# Patient Record
Sex: FEMALE | Race: OTHER
[De-identification: names, ages, dates, MRNs, and addresses within clinical notes are randomized per-mention and may not be internally consistent; named-entity substitution may affect disease eponyms.]

---

## 2020-11-13 ENCOUNTER — HOSPITAL ENCOUNTER (INPATIENT)
Dept: HOSPITAL 54 - TELE2 | Age: 55
LOS: 7 days | Discharge: NURSING, REHAB OR LONG-TERM CARE | DRG: 137 | End: 2020-11-20
Attending: INTERNAL MEDICINE | Admitting: INTERNAL MEDICINE
Payer: COMMERCIAL

## 2020-11-13 VITALS — SYSTOLIC BLOOD PRESSURE: 141 MMHG | DIASTOLIC BLOOD PRESSURE: 70 MMHG

## 2020-11-13 VITALS — WEIGHT: 165 LBS | HEIGHT: 61 IN | BODY MASS INDEX: 31.15 KG/M2

## 2020-11-13 VITALS — SYSTOLIC BLOOD PRESSURE: 110 MMHG | DIASTOLIC BLOOD PRESSURE: 61 MMHG

## 2020-11-13 VITALS — SYSTOLIC BLOOD PRESSURE: 115 MMHG | DIASTOLIC BLOOD PRESSURE: 66 MMHG

## 2020-11-13 VITALS — DIASTOLIC BLOOD PRESSURE: 70 MMHG | SYSTOLIC BLOOD PRESSURE: 141 MMHG

## 2020-11-13 VITALS — DIASTOLIC BLOOD PRESSURE: 61 MMHG | SYSTOLIC BLOOD PRESSURE: 110 MMHG

## 2020-11-13 DIAGNOSIS — J96.01: ICD-10-CM

## 2020-11-13 DIAGNOSIS — U07.1: Primary | ICD-10-CM

## 2020-11-13 DIAGNOSIS — H35.9: ICD-10-CM

## 2020-11-13 DIAGNOSIS — G43.909: ICD-10-CM

## 2020-11-13 DIAGNOSIS — D64.9: ICD-10-CM

## 2020-11-13 DIAGNOSIS — J12.89: ICD-10-CM

## 2020-11-13 DIAGNOSIS — E44.0: ICD-10-CM

## 2020-11-13 DIAGNOSIS — M06.4: ICD-10-CM

## 2020-11-13 LAB
ALBUMIN SERPL BCP-MCNC: 3 G/DL (ref 3.4–5)
ALP SERPL-CCNC: 88 U/L (ref 46–116)
ALT SERPL W P-5'-P-CCNC: 35 U/L (ref 12–78)
AST SERPL W P-5'-P-CCNC: 28 U/L (ref 15–37)
BASOPHILS # BLD AUTO: 0 /CMM (ref 0–0.2)
BASOPHILS NFR BLD AUTO: 0.2 % (ref 0–2)
BILIRUB SERPL-MCNC: 0.5 MG/DL (ref 0.2–1)
BUN SERPL-MCNC: 9 MG/DL (ref 7–18)
CALCIUM SERPL-MCNC: 9 MG/DL (ref 8.5–10.1)
CHLORIDE SERPL-SCNC: 101 MMOL/L (ref 98–107)
CO2 SERPL-SCNC: 29 MMOL/L (ref 21–32)
CREAT SERPL-MCNC: 0.9 MG/DL (ref 0.6–1.3)
EOSINOPHIL NFR BLD AUTO: 0.1 % (ref 0–6)
GLUCOSE SERPL-MCNC: 101 MG/DL (ref 74–106)
HCT VFR BLD AUTO: 36 % (ref 33–45)
HGB BLD-MCNC: 12 G/DL (ref 11.5–14.8)
LYMPHOCYTES NFR BLD AUTO: 1.1 /CMM (ref 0.8–4.8)
LYMPHOCYTES NFR BLD AUTO: 19.8 % (ref 20–44)
MCHC RBC AUTO-ENTMCNC: 33 G/DL (ref 31–36)
MCV RBC AUTO: 88 FL (ref 82–100)
MONOCYTES NFR BLD AUTO: 0.3 /CMM (ref 0.1–1.3)
MONOCYTES NFR BLD AUTO: 6.2 % (ref 2–12)
NEUTROPHILS # BLD AUTO: 4.1 /CMM (ref 1.8–8.9)
NEUTROPHILS NFR BLD AUTO: 73.7 % (ref 43–81)
PLATELET # BLD AUTO: 186 /CMM (ref 150–450)
POTASSIUM SERPL-SCNC: 4.4 MMOL/L (ref 3.5–5.1)
PROT SERPL-MCNC: 7.7 G/DL (ref 6.4–8.2)
RBC # BLD AUTO: 4.11 MIL/UL (ref 4–5.2)
SODIUM SERPL-SCNC: 137 MMOL/L (ref 136–145)
WBC NRBC COR # BLD AUTO: 5.5 K/UL (ref 4.3–11)

## 2020-11-13 PROCEDURE — G0378 HOSPITAL OBSERVATION PER HR: HCPCS

## 2020-11-13 RX ADMIN — ACETAMINOPHEN PRN MG: 325 TABLET ORAL at 23:37

## 2020-11-13 RX ADMIN — ACETAMINOPHEN PRN MG: 325 TABLET ORAL at 17:04

## 2020-11-13 NOTE — NUR
PT ASLEEP IN BED AND EASILY AROUSABLE. REPORT PAIN WHEN BREATHING IN ANTERIOR MID CHEST AND 
POSTERIOR CHEST AND MILD HEADACHE. ADMINISTERED ORDERED ACETAMINOPHEN. ALL ORDERS 
IMPLEMENTED. 2L/MIN NC RUNNING AND SAO2 98%. NO SIGNS OF DISTRESS. TELE MONITOR ON READING 
SR IN 60S WITH OCCASIONAL PVCS. PULSES STRONG. URINATED W NO ISSUES AND AMBULATES WELL TO 
BATHROOM. L AC SALINE LOCKED. DRESSING DRY AND INTACT. MONITORED BREATHING, RESPIRATIONS, 
SAO2 THROUGHOUT SHIFT. MONITORED VS AND PAIN THROUGHOUT SHIFT AND REPORTED AS NEEDED TO MD. 
WILL ENDORSE TO PM RN FOR CONTINUATION OF CARE.

## 2020-11-13 NOTE — NUR
RECEIVED PT FROM [facility]. PHONE REPORT GIVEN FROM CARDENAS. UPON ADMISSION PT A/OX 4. 
AMBULATORY W STEADY GAIT AND STRONG EXTREMITIES. REPORT PAIN WHEN BREATHING IN ANTERIOR MID 
CHEST AND POSTERIOR CHEST. 2L/MIN NC RUNNING AND SAO2 100%. NO SIGNS OF DISTRESS. TELE 
MONITOR ON READING SR IN 60S WITH OCCASIONAL PVCS. PULSES STRONG. URINATED W NO ISSUES AND 
AMBULATES WELL TO BATHROOM. L AC SALINE LOCKED. DRESSING DRY AND INTACT. PT BELONGINGS 
NOTED, PT EXPRESSED WANTING TO KEEP ALL BELONGINGS AT BEDSIDE. SKIN INTACT. NO CUTS BRUISES 
OR LESIONS. WILL MONITOR PATIENT AND REPORT TO MD AS NEEDED.

## 2020-11-13 NOTE — NUR
TELE/RN NOTE 



Patient c/o pain level 7 on back, aching and throbbing. Administered PRN norco as ordered. 
VSS Will continue to monitor.

## 2020-11-13 NOTE — NUR
TELE/RN OPENING NOTE 



Patient asleep in bed, A/O x4, ambulatory. No JVD. Tongue midline, no tracheal deviation. 
CRP <3seconds. Pedal and brachial pulses 2+, symmetrical. Tele monitor reading sinus rhythm 
with PVCs in the 60s. Breathing even, unlabored on 2 LPM NC. Skin warm, pink, dry 
appropriate for ethnicity, intact. IV site LAC 20g saline locked, patent and intact. No 
signs of redness or infiltration. Abdomen round, soft, non-tender. BS active. Patient void 
via BRP without difficulty. Bed in low position, wheels locked, side rails up x2, call light 
within reach.

## 2020-11-13 NOTE — NUR
Continue chantix another 4-5 months.  If she doesn't get off completely she is to let me know.   TELE/RN NOTE 



Patient temperature is 100.3. Administered PRN acetaminophen as ordered. Will continue to 
monitor.

## 2020-11-14 VITALS — SYSTOLIC BLOOD PRESSURE: 122 MMHG | DIASTOLIC BLOOD PRESSURE: 68 MMHG

## 2020-11-14 VITALS — DIASTOLIC BLOOD PRESSURE: 64 MMHG | SYSTOLIC BLOOD PRESSURE: 117 MMHG

## 2020-11-14 VITALS — DIASTOLIC BLOOD PRESSURE: 68 MMHG | SYSTOLIC BLOOD PRESSURE: 122 MMHG

## 2020-11-14 VITALS — SYSTOLIC BLOOD PRESSURE: 109 MMHG | DIASTOLIC BLOOD PRESSURE: 69 MMHG

## 2020-11-14 VITALS — DIASTOLIC BLOOD PRESSURE: 62 MMHG | SYSTOLIC BLOOD PRESSURE: 108 MMHG

## 2020-11-14 VITALS — DIASTOLIC BLOOD PRESSURE: 99 MMHG | SYSTOLIC BLOOD PRESSURE: 146 MMHG

## 2020-11-14 VITALS — SYSTOLIC BLOOD PRESSURE: 116 MMHG | DIASTOLIC BLOOD PRESSURE: 65 MMHG

## 2020-11-14 LAB
BASOPHILS # BLD AUTO: 0 /CMM (ref 0–0.2)
BASOPHILS NFR BLD AUTO: 0.3 % (ref 0–2)
BUN SERPL-MCNC: 11 MG/DL (ref 7–18)
CALCIUM SERPL-MCNC: 9 MG/DL (ref 8.5–10.1)
CHLORIDE SERPL-SCNC: 100 MMOL/L (ref 98–107)
CHOLEST SERPL-MCNC: 118 MG/DL (ref ?–200)
CO2 SERPL-SCNC: 30 MMOL/L (ref 21–32)
CREAT SERPL-MCNC: 1 MG/DL (ref 0.6–1.3)
CRP SERPL-MCNC: 17.4 MG/DL (ref 0–0.9)
EOSINOPHIL NFR BLD AUTO: 0 % (ref 0–6)
FERRITIN SERPL-MCNC: 515 NG/ML (ref 8–388)
GLUCOSE SERPL-MCNC: 97 MG/DL (ref 74–106)
HCT VFR BLD AUTO: 34 % (ref 33–45)
HDLC SERPL-MCNC: 34 MG/DL (ref 40–60)
HGB BLD-MCNC: 11.2 G/DL (ref 11.5–14.8)
LDLC SERPL DIRECT ASSAY-MCNC: 75 MG/DL (ref 0–99)
LYMPHOCYTES NFR BLD AUTO: 1.1 /CMM (ref 0.8–4.8)
LYMPHOCYTES NFR BLD AUTO: 20.6 % (ref 20–44)
MAGNESIUM SERPL-MCNC: 2.4 MG/DL (ref 1.8–2.4)
MCHC RBC AUTO-ENTMCNC: 33 G/DL (ref 31–36)
MCV RBC AUTO: 88 FL (ref 82–100)
MONOCYTES NFR BLD AUTO: 0.3 /CMM (ref 0.1–1.3)
MONOCYTES NFR BLD AUTO: 5.2 % (ref 2–12)
NEUTROPHILS # BLD AUTO: 4.1 /CMM (ref 1.8–8.9)
NEUTROPHILS NFR BLD AUTO: 73.9 % (ref 43–81)
PHOSPHATE SERPL-MCNC: 3.7 MG/DL (ref 2.5–4.9)
PLATELET # BLD AUTO: 187 /CMM (ref 150–450)
POTASSIUM SERPL-SCNC: 4 MMOL/L (ref 3.5–5.1)
RBC # BLD AUTO: 3.81 MIL/UL (ref 4–5.2)
SODIUM SERPL-SCNC: 136 MMOL/L (ref 136–145)
TRIGL SERPL-MCNC: 86 MG/DL (ref 30–150)
WBC NRBC COR # BLD AUTO: 5.5 K/UL (ref 4.3–11)

## 2020-11-14 PROCEDURE — 30233L1 TRANSFUSION OF NONAUTOLOGOUS FRESH PLASMA INTO PERIPHERAL VEIN, PERCUTANEOUS APPROACH: ICD-10-PCS | Performed by: INTERNAL MEDICINE

## 2020-11-14 RX ADMIN — ACETAMINOPHEN PRN MG: 325 TABLET ORAL at 14:48

## 2020-11-14 RX ADMIN — DEXTROSE MONOHYDRATE SCH MLS/HR: 50 INJECTION, SOLUTION INTRAVENOUS at 09:04

## 2020-11-14 RX ADMIN — DEXTROSE MONOHYDRATE SCH MLS/HR: 50 INJECTION, SOLUTION INTRAVENOUS at 01:58

## 2020-11-14 RX ADMIN — PANTOPRAZOLE SODIUM SCH MG: 40 TABLET, DELAYED RELEASE ORAL at 06:33

## 2020-11-14 RX ADMIN — DEXAMETHASONE SODIUM PHOSPHATE SCH MG: 10 INJECTION INTRAMUSCULAR; INTRAVENOUS at 08:50

## 2020-11-14 NOTE — NUR
RN CLOSING NOTE 



Patient is resting in bed, A/o x4, SOB upon exertion, o2 95% on 3L NC. Tele monitor SR 
60-70s. Patient denies any pain at this time. Temperature is 99.6F, cooling measures 
implemented and temp went down to 98.8F. IV line in the LAC#20g is clean and intact s/l. 
Patient is ambulatory with BRP and independent with care. Consent for convalsecent plasma 
has been signed by the patient. 

All patient needs met, all due medications given, patient kept clean and dry throughout 
shift. Bed is in lowest position, side rails x2 in upright position, call light is within 
reach, fall safety and aspiration precautions enforced. Isolation for COVID positive. Will 
endorse to night shift.

## 2020-11-14 NOTE — NUR
RECEIVED  IN BED LYING ON HER RIGHT SIDE

ALERT ORIENTATED X4

WHEN NAME SPOKEN  TURNED OVER AND ACKNOWLEDGED THE NURSE

DENIES SOB OR PAIN 

02 ON NC 92% AT  4LITERS

## 2020-11-14 NOTE — NUR
RN OPENING NOTE 



Patient is resting in bed, A/o x4, SOB upon exertion, o2 95% on 3L NC. Tele monitor SR 67. 
Patient denies any pain at this time. Temperature is 99.6F, cooling measures implemented and 
temp went down to 98.8F. IV line in the LAC#20g is clean and intact s/l. Patient is 
ambulatory with BRP and independent with care. Bed is in lowest position, side rails x2 in 
upright position, call light is within reach, fall safety and aspiration precautions 
enforced. Isolation for COVID positive. Will continue with plan of care.

## 2020-11-14 NOTE — NUR
RN NOTE 



Per Dr. Alcantar to order convalescent plasma for patient. Paperwork has been filled out by Dr. Alcantar.

## 2020-11-14 NOTE — NUR
TELE/RN CLOSING NOTE 



Patient asleep in bed, A/O x4, ambulatory. Tele monitor reading sinus rhythm with PVCs in 
the 70s. Breathing even, unlabored on 2 LPM NC. Skin warm, pink, dry appropriate for 
ethnicity, intact. IV site LAC 20g saline locked, patent and intact. No signs of redness or 
infiltration.  Patient void via BRP 4x, without difficulty. All scheduled medications given 
and orders carried out. Bed in low position, wheels locked, side rails up x2, call light 
within reach.

## 2020-11-15 VITALS — DIASTOLIC BLOOD PRESSURE: 71 MMHG | SYSTOLIC BLOOD PRESSURE: 122 MMHG

## 2020-11-15 VITALS — DIASTOLIC BLOOD PRESSURE: 70 MMHG | SYSTOLIC BLOOD PRESSURE: 112 MMHG

## 2020-11-15 VITALS — SYSTOLIC BLOOD PRESSURE: 130 MMHG | DIASTOLIC BLOOD PRESSURE: 73 MMHG

## 2020-11-15 VITALS — SYSTOLIC BLOOD PRESSURE: 123 MMHG | DIASTOLIC BLOOD PRESSURE: 70 MMHG

## 2020-11-15 VITALS — SYSTOLIC BLOOD PRESSURE: 123 MMHG | DIASTOLIC BLOOD PRESSURE: 67 MMHG

## 2020-11-15 VITALS — DIASTOLIC BLOOD PRESSURE: 70 MMHG | SYSTOLIC BLOOD PRESSURE: 127 MMHG

## 2020-11-15 VITALS — DIASTOLIC BLOOD PRESSURE: 78 MMHG | SYSTOLIC BLOOD PRESSURE: 142 MMHG

## 2020-11-15 LAB
ALBUMIN SERPL BCP-MCNC: 2.7 G/DL (ref 3.4–5)
ALP SERPL-CCNC: 89 U/L (ref 46–116)
ALT SERPL W P-5'-P-CCNC: 37 U/L (ref 12–78)
AST SERPL W P-5'-P-CCNC: 27 U/L (ref 15–37)
BASOPHILS # BLD AUTO: 0 /CMM (ref 0–0.2)
BASOPHILS NFR BLD AUTO: 0.1 % (ref 0–2)
BILIRUB DIRECT SERPL-MCNC: 0.1 MG/DL (ref 0–0.2)
BILIRUB SERPL-MCNC: 0.4 MG/DL (ref 0.2–1)
BUN SERPL-MCNC: 14 MG/DL (ref 7–18)
CALCIUM SERPL-MCNC: 9.2 MG/DL (ref 8.5–10.1)
CHLORIDE SERPL-SCNC: 103 MMOL/L (ref 98–107)
CO2 SERPL-SCNC: 25 MMOL/L (ref 21–32)
CREAT SERPL-MCNC: 0.9 MG/DL (ref 0.6–1.3)
EOSINOPHIL NFR BLD AUTO: 0 % (ref 0–6)
GLUCOSE SERPL-MCNC: 95 MG/DL (ref 74–106)
HCT VFR BLD AUTO: 32 % (ref 33–45)
HGB BLD-MCNC: 10.9 G/DL (ref 11.5–14.8)
LYMPHOCYTES NFR BLD AUTO: 1.1 /CMM (ref 0.8–4.8)
LYMPHOCYTES NFR BLD AUTO: 13.2 % (ref 20–44)
MAGNESIUM SERPL-MCNC: 2.3 MG/DL (ref 1.8–2.4)
MCHC RBC AUTO-ENTMCNC: 34 G/DL (ref 31–36)
MCV RBC AUTO: 87 FL (ref 82–100)
MONOCYTES NFR BLD AUTO: 0.5 /CMM (ref 0.1–1.3)
MONOCYTES NFR BLD AUTO: 5.8 % (ref 2–12)
NEUTROPHILS # BLD AUTO: 6.6 /CMM (ref 1.8–8.9)
NEUTROPHILS NFR BLD AUTO: 80.9 % (ref 43–81)
PHOSPHATE SERPL-MCNC: 2.9 MG/DL (ref 2.5–4.9)
PLATELET # BLD AUTO: 217 /CMM (ref 150–450)
POTASSIUM SERPL-SCNC: 3.5 MMOL/L (ref 3.5–5.1)
PROT SERPL-MCNC: 7.3 G/DL (ref 6.4–8.2)
RBC # BLD AUTO: 3.72 MIL/UL (ref 4–5.2)
SODIUM SERPL-SCNC: 139 MMOL/L (ref 136–145)
WBC NRBC COR # BLD AUTO: 8.1 K/UL (ref 4.3–11)

## 2020-11-15 RX ADMIN — DEXTROSE MONOHYDRATE SCH MLS/HR: 50 INJECTION, SOLUTION INTRAVENOUS at 09:30

## 2020-11-15 RX ADMIN — PANTOPRAZOLE SODIUM SCH MG: 40 TABLET, DELAYED RELEASE ORAL at 08:35

## 2020-11-15 RX ADMIN — ENOXAPARIN SODIUM SCH MG: 40 INJECTION SUBCUTANEOUS at 11:03

## 2020-11-15 RX ADMIN — DEXTROSE MONOHYDRATE SCH MLS/HR: 50 INJECTION, SOLUTION INTRAVENOUS at 08:36

## 2020-11-15 RX ADMIN — DEXAMETHASONE SODIUM PHOSPHATE SCH MG: 10 INJECTION INTRAMUSCULAR; INTRAVENOUS at 08:35

## 2020-11-15 RX ADMIN — REMDESIVIR SCH MLS/HR: 5 INJECTION INTRAVENOUS at 16:09

## 2020-11-15 NOTE — NUR
TELE RN NOTES



FOLLOWED UP WITH BLOOD BANK ABOUT CONVALESCENT PLASMA AND PLASMA IS NOT AVAILABLE YET, BLOOD 
BANK WILL CALL AND NOTIFY WHEN IT IS READY.

## 2020-11-15 NOTE — NUR
TELE RN NOTES



PATIENT IN BED RESTING COMFORTABLY, ALERT AND ORIENTED X 4, Georgian SPEAKING. ON NASAL 
CANNULA 3 LITERS WITH NO SIGNS OF RESPIRATORY DISTRESS, WITH EVEN NON-LABORED BREATHING AT 
THIS TIME. ON CARDIAC MONITOR, SINUS RHYTHM 63. STILL AWAITING FOR CONVALESCENT PLASMA. SKIN 
KEPT CLEAN, WARM AND DRY TO TOUCH. IV ACCESS INTACT AND PATENT. MET ALL OF PATIENTS NEEDS. 
PATIENT PRESENTS WITH NO PAIN OR DISCOMFORT AT THIS TIME. ISOLATION PRECAUTIONS IMPLEMENTED. 
SAFETY PRECAUTIONS IMPLEMENTED WITH BED LOCKED, BED IN THE LOWEST POSITION, BILATERAL SIDE 
RAILS UP, BED ALARM ON, AND CALL LIGHT WITHIN EASY REACH. WILL ENDORSE PLAN OF CARE TO 
UPCOMING RN.

## 2020-11-15 NOTE — NUR
CLOSING NOTES: ALERT AND ORIENTATED X4 ONE COUGHING SPELL THIS NIGHT AMBULATES TO THE 
BATHROOM STEADY ON HER FEET. SOB WHEN SHE AMBULATES TO THE BATHROOM, INSTRUCTED HER TO KEP 
THE 02 ON D/T THE TUBING IS LONG AND WILL MAKE IT TO THE BATHROOM.



02 AT 2 LITERS SATS 95%

## 2020-11-15 NOTE — NUR
TELE RN NOTES



FOLLOWED UP WITH BLOOD BANK ABOUT CONVALESCENT PLASMA AND PLASMA IS NOT AVAILABLE YET.

## 2020-11-15 NOTE — NUR
rn notes:

follow up with lab regarding plasma convalescent, per lab there still no available unit at 
si time. will let us know once they receive it.

## 2020-11-15 NOTE — NUR
TELE RN NOTES



PATIENT RECEIVED IN BED RESTING COMFORTABLY, ALERT AND ORIENTED X 4, Welsh SPEAKING. ON 
NASAL CANNULA 2LITERS WITH NO SIGNS OF RESPIRATORY DISTRESS, WITH EVEN NON-LABORED BREATHING 
AT THIS TIME. ON CARDIAC MONITOR, SINUS RHYTHM 66. SKIN WARM AND DRY TO TOUCH. IV ACCESS 
INTACT AND PATENT. PATIENT PRESENTS WITH NO PAIN OR DISCOMFORT AT THIS TIME. ISOLATION 
PRECAUTIONS IMPLEMENTED. SAFETY PRECAUTIONS IMPLEMENTED WITH BED LOCKED, BED IN THE LOWEST 
POSITION, BILATERAL SIDE RAILS UP, BED ALARM ON, AND CALL LIGHT WITHIN EASY REACH. WILL 
CONTINUE TO MONITOR PATIENT.

## 2020-11-16 VITALS — SYSTOLIC BLOOD PRESSURE: 117 MMHG | DIASTOLIC BLOOD PRESSURE: 65 MMHG

## 2020-11-16 VITALS — SYSTOLIC BLOOD PRESSURE: 134 MMHG | DIASTOLIC BLOOD PRESSURE: 72 MMHG

## 2020-11-16 VITALS — DIASTOLIC BLOOD PRESSURE: 77 MMHG | SYSTOLIC BLOOD PRESSURE: 140 MMHG

## 2020-11-16 VITALS — SYSTOLIC BLOOD PRESSURE: 117 MMHG | DIASTOLIC BLOOD PRESSURE: 75 MMHG

## 2020-11-16 VITALS — DIASTOLIC BLOOD PRESSURE: 7 MMHG | SYSTOLIC BLOOD PRESSURE: 134 MMHG

## 2020-11-16 VITALS — SYSTOLIC BLOOD PRESSURE: 113 MMHG | DIASTOLIC BLOOD PRESSURE: 67 MMHG

## 2020-11-16 VITALS — SYSTOLIC BLOOD PRESSURE: 136 MMHG | DIASTOLIC BLOOD PRESSURE: 75 MMHG

## 2020-11-16 VITALS — SYSTOLIC BLOOD PRESSURE: 121 MMHG | DIASTOLIC BLOOD PRESSURE: 121 MMHG

## 2020-11-16 LAB
ALBUMIN SERPL BCP-MCNC: 2.7 G/DL (ref 3.4–5)
ALP SERPL-CCNC: 79 U/L (ref 46–116)
ALT SERPL W P-5'-P-CCNC: 31 U/L (ref 12–78)
AST SERPL W P-5'-P-CCNC: 20 U/L (ref 15–37)
BASOPHILS # BLD AUTO: 0 /CMM (ref 0–0.2)
BASOPHILS NFR BLD AUTO: 0.3 % (ref 0–2)
BILIRUB DIRECT SERPL-MCNC: 0.1 MG/DL (ref 0–0.2)
BILIRUB SERPL-MCNC: 0.3 MG/DL (ref 0.2–1)
BUN SERPL-MCNC: 17 MG/DL (ref 7–18)
CALCIUM SERPL-MCNC: 9.4 MG/DL (ref 8.5–10.1)
CHLORIDE SERPL-SCNC: 105 MMOL/L (ref 98–107)
CO2 SERPL-SCNC: 28 MMOL/L (ref 21–32)
CREAT SERPL-MCNC: 0.9 MG/DL (ref 0.6–1.3)
EOSINOPHIL NFR BLD AUTO: 0 % (ref 0–6)
GLUCOSE SERPL-MCNC: 98 MG/DL (ref 74–106)
HCT VFR BLD AUTO: 33 % (ref 33–45)
HGB BLD-MCNC: 11.2 G/DL (ref 11.5–14.8)
LYMPHOCYTES NFR BLD AUTO: 1 /CMM (ref 0.8–4.8)
LYMPHOCYTES NFR BLD AUTO: 18.8 % (ref 20–44)
MAGNESIUM SERPL-MCNC: 2.4 MG/DL (ref 1.8–2.4)
MCHC RBC AUTO-ENTMCNC: 34 G/DL (ref 31–36)
MCV RBC AUTO: 89 FL (ref 82–100)
MONOCYTES NFR BLD AUTO: 0.5 /CMM (ref 0.1–1.3)
MONOCYTES NFR BLD AUTO: 10 % (ref 2–12)
NEUTROPHILS # BLD AUTO: 3.8 /CMM (ref 1.8–8.9)
NEUTROPHILS NFR BLD AUTO: 70.9 % (ref 43–81)
PHOSPHATE SERPL-MCNC: 3.3 MG/DL (ref 2.5–4.9)
PLATELET # BLD AUTO: 259 /CMM (ref 150–450)
POTASSIUM SERPL-SCNC: 4 MMOL/L (ref 3.5–5.1)
PROT SERPL-MCNC: 7.4 G/DL (ref 6.4–8.2)
RBC # BLD AUTO: 3.76 MIL/UL (ref 4–5.2)
SODIUM SERPL-SCNC: 141 MMOL/L (ref 136–145)
WBC NRBC COR # BLD AUTO: 5.3 K/UL (ref 4.3–11)

## 2020-11-16 RX ADMIN — DEXTROSE MONOHYDRATE SCH MLS/HR: 50 INJECTION, SOLUTION INTRAVENOUS at 08:35

## 2020-11-16 RX ADMIN — DEXTROSE MONOHYDRATE SCH MLS/HR: 50 INJECTION, SOLUTION INTRAVENOUS at 08:37

## 2020-11-16 RX ADMIN — GUAIFENESIN AND DEXTROMETHORPHAN PRN ML: 100; 10 SYRUP ORAL at 18:01

## 2020-11-16 RX ADMIN — ENOXAPARIN SODIUM SCH MG: 40 INJECTION SUBCUTANEOUS at 08:39

## 2020-11-16 RX ADMIN — REMDESIVIR SCH MLS/HR: 5 INJECTION INTRAVENOUS at 16:40

## 2020-11-16 RX ADMIN — PANTOPRAZOLE SODIUM SCH MG: 40 TABLET, DELAYED RELEASE ORAL at 08:28

## 2020-11-16 RX ADMIN — DEXAMETHASONE SODIUM PHOSPHATE SCH MG: 10 INJECTION INTRAMUSCULAR; INTRAVENOUS at 08:28

## 2020-11-16 NOTE — NUR
rn notes/plasma follow up:

Contacted lab x [phone], spoked with [facility], per [facility] there is no available plasma at this 
time, but the [facility], stated they will call in am once it is receive.

## 2020-11-16 NOTE — NUR
TELE RN NOTES



RECEIVED PATIENT IN BED,  ALERT AND ORIENTED X4. HOB ELEVATED. ON O2 AT 4L/MIN VIA NC. RT 
HAND # 20 SL INTACT AND PATENT. ON TELE MONITORING SR: 60. BED IN LOWEST POSITION, LOCKED. 
BED ALARM ON. FREQUENT VISUAL CHECK DONE.  CALL LIGHT WITHIN REACH. ABLE TO VERBALIZE NEEDS.

## 2020-11-16 NOTE — NUR
TELE RN NOTES



PATIENT RESTING COMFORTABLY IN BED, ASLEEP.  ALERT AND ORIENTED X4. HOB ELEVATED. TITRATED 
PATIENT DURING THE SHIFT, NOW ON 02 AT 2L/MIN VIA NC.  NO S/S OF RESPIRATORY DISTRESS DURING 
THE SHIFT. WITH OCCASIONAL PRODUCTIVE COUGH BUT PATIENT ABLE TO COUGH OUT PHLEGM. RIGHT HAND 
# 20 SL  AND LT AC # 18 SL INTACT AND PATENT. BED IN LOWEST POSITION, LOCKED. S/P 
CONVALESCENT PLASMA AND DAY 2 OF REMDESIVIR WITHOUT S/S OF COMPLICATIONS OBSERVED DURING THE 
SHIFT. BED ALARM ON. FREQUENT VISUAL CHECK DONE. IN NO APPARENT DISTRESS. CALL LIGHT WITHIN 
REACH. ABLE TO VERBALIZE NEEDS.

## 2020-11-16 NOTE — NUR
End of shift report:

Kept on airborne isolation as pt covid positive, ppe utilized, with used of n95 and face 
shield. Pt on sinus cindi hr 59. Pt remains a/o x4, on 4l oxygen via nc, respirations even 
and unlabored, spo2 ranging 93-95%. Remains afebrile. Pt ambulatory, continent, brp. Plan of 
care: Continue Dexamethasone inj daily, On remdesevir iv q24hr, still waiting for 
convalescent plasma to become available., blood transfusion consent attached to chart. 
Safety precautions for fall initiated, call light in reach, will endorse to day rn for roel.

## 2020-11-16 NOTE — NUR
TELE RN NOTE:



PATIENT RESTING IN BED, NO ACUTE DISTRESS NOTED. BREATHING EVEN AND UNLABORED, NO SOB NOTED. 
IV TO RIGHT HAND AND LAC IN PLACE. ISOLATION PRECAUTIONS OBSERVED. BED LOCKED AND IN LOWEST 
POSITION, CALL LIGHT IN REACH. WILL CONTINUE TO MONITOR.

## 2020-11-17 VITALS — SYSTOLIC BLOOD PRESSURE: 136 MMHG | DIASTOLIC BLOOD PRESSURE: 90 MMHG

## 2020-11-17 VITALS — SYSTOLIC BLOOD PRESSURE: 137 MMHG | DIASTOLIC BLOOD PRESSURE: 64 MMHG

## 2020-11-17 VITALS — SYSTOLIC BLOOD PRESSURE: 136 MMHG | DIASTOLIC BLOOD PRESSURE: 77 MMHG

## 2020-11-17 VITALS — DIASTOLIC BLOOD PRESSURE: 71 MMHG | SYSTOLIC BLOOD PRESSURE: 128 MMHG

## 2020-11-17 VITALS — SYSTOLIC BLOOD PRESSURE: 141 MMHG | DIASTOLIC BLOOD PRESSURE: 74 MMHG

## 2020-11-17 LAB
ALBUMIN SERPL BCP-MCNC: 2.8 G/DL (ref 3.4–5)
ALP SERPL-CCNC: 72 U/L (ref 46–116)
ALT SERPL W P-5'-P-CCNC: 28 U/L (ref 12–78)
AST SERPL W P-5'-P-CCNC: 17 U/L (ref 15–37)
BASOPHILS # BLD AUTO: 0 /CMM (ref 0–0.2)
BASOPHILS NFR BLD AUTO: 0.1 % (ref 0–2)
BILIRUB DIRECT SERPL-MCNC: 0.1 MG/DL (ref 0–0.2)
BILIRUB SERPL-MCNC: 0.4 MG/DL (ref 0.2–1)
BUN SERPL-MCNC: 18 MG/DL (ref 7–18)
CALCIUM SERPL-MCNC: 9.5 MG/DL (ref 8.5–10.1)
CHLORIDE SERPL-SCNC: 105 MMOL/L (ref 98–107)
CK SERPL-CCNC: 23 U/L (ref 26–192)
CO2 SERPL-SCNC: 25 MMOL/L (ref 21–32)
CREAT SERPL-MCNC: 0.8 MG/DL (ref 0.6–1.3)
CRP SERPL-MCNC: 5.1 MG/DL (ref 0–0.9)
CRP SERPL-MCNC: 5.3 MG/DL (ref 0–0.9)
EOSINOPHIL NFR BLD AUTO: 0.1 % (ref 0–6)
FERRITIN SERPL-MCNC: 502 NG/ML (ref 8–388)
GLUCOSE SERPL-MCNC: 87 MG/DL (ref 74–106)
HCT VFR BLD AUTO: 35 % (ref 33–45)
HGB BLD-MCNC: 11.6 G/DL (ref 11.5–14.8)
LYMPHOCYTES NFR BLD AUTO: 1.5 /CMM (ref 0.8–4.8)
LYMPHOCYTES NFR BLD AUTO: 19.9 % (ref 20–44)
MAGNESIUM SERPL-MCNC: 2.4 MG/DL (ref 1.8–2.4)
MCHC RBC AUTO-ENTMCNC: 34 G/DL (ref 31–36)
MCV RBC AUTO: 88 FL (ref 82–100)
MONOCYTES NFR BLD AUTO: 0.5 /CMM (ref 0.1–1.3)
MONOCYTES NFR BLD AUTO: 6.8 % (ref 2–12)
NEUTROPHILS # BLD AUTO: 5.4 /CMM (ref 1.8–8.9)
NEUTROPHILS NFR BLD AUTO: 73.1 % (ref 43–81)
PHOSPHATE SERPL-MCNC: 2.9 MG/DL (ref 2.5–4.9)
PLATELET # BLD AUTO: 305 /CMM (ref 150–450)
POTASSIUM SERPL-SCNC: 3.7 MMOL/L (ref 3.5–5.1)
PROT SERPL-MCNC: 7.4 G/DL (ref 6.4–8.2)
RBC # BLD AUTO: 3.94 MIL/UL (ref 4–5.2)
SODIUM SERPL-SCNC: 141 MMOL/L (ref 136–145)
WBC NRBC COR # BLD AUTO: 7.3 K/UL (ref 4.3–11)

## 2020-11-17 RX ADMIN — DEXAMETHASONE SODIUM PHOSPHATE SCH MG: 10 INJECTION INTRAMUSCULAR; INTRAVENOUS at 08:14

## 2020-11-17 RX ADMIN — PANTOPRAZOLE SODIUM SCH MG: 40 TABLET, DELAYED RELEASE ORAL at 08:14

## 2020-11-17 RX ADMIN — GUAIFENESIN AND DEXTROMETHORPHAN PRN ML: 100; 10 SYRUP ORAL at 20:06

## 2020-11-17 RX ADMIN — ENOXAPARIN SODIUM SCH MG: 40 INJECTION SUBCUTANEOUS at 08:27

## 2020-11-17 RX ADMIN — REMDESIVIR SCH MLS/HR: 5 INJECTION INTRAVENOUS at 16:34

## 2020-11-17 RX ADMIN — DEXTROSE MONOHYDRATE SCH MLS/HR: 50 INJECTION, SOLUTION INTRAVENOUS at 08:14

## 2020-11-17 RX ADMIN — AZITHROMYCIN DIHYDRATE SCH MG: 250 TABLET, FILM COATED ORAL at 08:14

## 2020-11-17 NOTE — NUR
RN TELE OPENING NOTES

 RECEIVED PATIENT IN BED AWAKE ALERT AND ORIENTED X4 , ON 2 L VIA NC TOLERATING WELL, SP02 
97%, RESPIRATIONS EVEN AND UNLABORED WITH EQUAL RISE AND FALL OF CHEST, DENIES ANY PAIN OR 
DISCOMFORT AT THIS TIME, ON TELE MONITORING SB 53. NO DISTRESS PRESENT, IV SITE TO LEFT HAND 
#20G INTACT AND PATENT, LEFT AC #18 G INTACT AND PATENT, NO REDNESS, NO INFILTRATION 
PRESENT. SAFETY PRECAUTIONS RENDERED, LOW BED AND LOCKED, ALL NEEDS ATTENDED ORIENTED TO 
STAFF AND CALL LIGHT AND KEPT WITHIN REACH, WILL CONTINUE TO MONITOR REMAINS COMFORTABLE.

## 2020-11-17 NOTE — NUR
rn tele notes

patient noted with cough . patient requested for cough medication.

 robitussin prn as ordered offered pt agreed and given.

will continue to monitor and attend to needs.

## 2020-11-17 NOTE — NUR
RN TELE NOTES

PT IN BED, RESTING, DENIES PAIN OR ANY DISCOMFORT, BREATHING PATTERN NORMAL, PM MEDS GIVEN 
AS ORDERED, TOLERATES CURRENT DIET, KEPT WARM AND COMFORTABLE IN BED.

## 2020-11-17 NOTE — NUR
RN TELE NOTES

PT IN BED, AWAKE, ALERT AND ORIENTED, DENIES PAIN, BREATHING PATTERN NORMAL, ON O2 AT 2LPM 
VIA N/C, NO COMPLAINT OF SOB, ABLE TO AMBULATE TO THE BATHROOM WITH STEADY GAIT, DUE MEDS 
GIVEN AS ORDERED.

## 2020-11-17 NOTE — NUR
RN TELE NOTES

PT IN BED, AWAKE, ALERT AND ORIENTED, DENIES PAIN, BREATHING PATTERN NORMAL, CALL LIGHT 
WITHIN REACH, ISOLATION PRECAUTIONS OBSERVED, NEEDS ATTENDED, KEPT WARM AND COMFORTABLE IN 
BED.

## 2020-11-17 NOTE — NUR
TELE RN NOTE:



PATIENT SLEEPING IN BED, NO ACUTE DISTRESS NOTED. BREATHING EVEN AND UNLABORED, NO SOB 
NOTED. IV TO RIGHT HAND AND LAC IN PLACE. ISOLATION PRECAUTIONS OBSERVED. BED LOCKED AND IN 
LOWEST POSITION, CALL LIGHT IN REACH. WILL ENDORSE TO DAY NURSE TO CONTINUE WITH PLAN OF 
CARE.

-------------------------------------------------------------------------------

Addendum: 11/17/20 at 0630 by ANIL CARLIN RN

-------------------------------------------------------------------------------

ADJUST TIME TO 0630

## 2020-11-18 VITALS — SYSTOLIC BLOOD PRESSURE: 124 MMHG | DIASTOLIC BLOOD PRESSURE: 60 MMHG

## 2020-11-18 VITALS — DIASTOLIC BLOOD PRESSURE: 79 MMHG | SYSTOLIC BLOOD PRESSURE: 120 MMHG

## 2020-11-18 VITALS — DIASTOLIC BLOOD PRESSURE: 81 MMHG | SYSTOLIC BLOOD PRESSURE: 140 MMHG

## 2020-11-18 VITALS — SYSTOLIC BLOOD PRESSURE: 150 MMHG | DIASTOLIC BLOOD PRESSURE: 83 MMHG

## 2020-11-18 VITALS — SYSTOLIC BLOOD PRESSURE: 130 MMHG | DIASTOLIC BLOOD PRESSURE: 80 MMHG

## 2020-11-18 VITALS — DIASTOLIC BLOOD PRESSURE: 80 MMHG | SYSTOLIC BLOOD PRESSURE: 130 MMHG

## 2020-11-18 VITALS — SYSTOLIC BLOOD PRESSURE: 140 MMHG | DIASTOLIC BLOOD PRESSURE: 81 MMHG

## 2020-11-18 LAB
ALBUMIN SERPL BCP-MCNC: 2.8 G/DL (ref 3.4–5)
ALP SERPL-CCNC: 73 U/L (ref 46–116)
ALT SERPL W P-5'-P-CCNC: 33 U/L (ref 12–78)
AST SERPL W P-5'-P-CCNC: 18 U/L (ref 15–37)
BASOPHILS # BLD AUTO: 0 /CMM (ref 0–0.2)
BASOPHILS NFR BLD AUTO: 0.2 % (ref 0–2)
BILIRUB DIRECT SERPL-MCNC: 0.1 MG/DL (ref 0–0.2)
BILIRUB SERPL-MCNC: 0.4 MG/DL (ref 0.2–1)
BUN SERPL-MCNC: 20 MG/DL (ref 7–18)
CALCIUM SERPL-MCNC: 9.4 MG/DL (ref 8.5–10.1)
CHLORIDE SERPL-SCNC: 105 MMOL/L (ref 98–107)
CK SERPL-CCNC: 18 U/L (ref 26–192)
CO2 SERPL-SCNC: 25 MMOL/L (ref 21–32)
CREAT SERPL-MCNC: 0.9 MG/DL (ref 0.6–1.3)
CRP SERPL-MCNC: 3.9 MG/DL (ref 0–0.9)
EOSINOPHIL NFR BLD AUTO: 0.2 % (ref 0–6)
FERRITIN SERPL-MCNC: 463 NG/ML (ref 8–388)
GLUCOSE SERPL-MCNC: 79 MG/DL (ref 74–106)
HCT VFR BLD AUTO: 36 % (ref 33–45)
HGB BLD-MCNC: 12.1 G/DL (ref 11.5–14.8)
LYMPHOCYTES NFR BLD AUTO: 1.9 /CMM (ref 0.8–4.8)
LYMPHOCYTES NFR BLD AUTO: 23.7 % (ref 20–44)
MAGNESIUM SERPL-MCNC: 2.2 MG/DL (ref 1.8–2.4)
MCHC RBC AUTO-ENTMCNC: 34 G/DL (ref 31–36)
MCV RBC AUTO: 88 FL (ref 82–100)
MONOCYTES NFR BLD AUTO: 0.7 /CMM (ref 0.1–1.3)
MONOCYTES NFR BLD AUTO: 8.4 % (ref 2–12)
NEUTROPHILS # BLD AUTO: 5.5 /CMM (ref 1.8–8.9)
NEUTROPHILS NFR BLD AUTO: 67.5 % (ref 43–81)
PHOSPHATE SERPL-MCNC: 2.6 MG/DL (ref 2.5–4.9)
PLATELET # BLD AUTO: 342 /CMM (ref 150–450)
POTASSIUM SERPL-SCNC: 3.8 MMOL/L (ref 3.5–5.1)
PROT SERPL-MCNC: 7.4 G/DL (ref 6.4–8.2)
RBC # BLD AUTO: 4.06 MIL/UL (ref 4–5.2)
SODIUM SERPL-SCNC: 141 MMOL/L (ref 136–145)
WBC NRBC COR # BLD AUTO: 8.2 K/UL (ref 4.3–11)

## 2020-11-18 RX ADMIN — AZITHROMYCIN DIHYDRATE SCH MG: 250 TABLET, FILM COATED ORAL at 08:29

## 2020-11-18 RX ADMIN — DEXTROSE MONOHYDRATE SCH MLS/HR: 50 INJECTION, SOLUTION INTRAVENOUS at 08:33

## 2020-11-18 RX ADMIN — DEXAMETHASONE SODIUM PHOSPHATE SCH MG: 10 INJECTION INTRAMUSCULAR; INTRAVENOUS at 08:28

## 2020-11-18 RX ADMIN — REMDESIVIR SCH MLS/HR: 5 INJECTION INTRAVENOUS at 16:56

## 2020-11-18 RX ADMIN — ENOXAPARIN SODIUM SCH MG: 40 INJECTION SUBCUTANEOUS at 08:31

## 2020-11-18 RX ADMIN — PANTOPRAZOLE SODIUM SCH MG: 40 TABLET, DELAYED RELEASE ORAL at 08:28

## 2020-11-18 NOTE — NUR
RN OPENING NOTES



Received patient awake, resting on bed. No complaints made. On tele monitor with Sinus Husam 
noted. On RA, saturating well. Pt denies any discomfort at this time. On fall and aspiration 
precautions. Will continue to monitor accordingly.

## 2020-11-18 NOTE — NUR
TELE/RN CLOSING NOTES

PATIENT IS ON BED. ALERT AND ORIENTED X4. PATIENT IN NO APPARENT RESPIRATORY DISTRESS NOTED. 
NO SIGN AND SYMPTOM OF PAIN AT THIS TIME. ON OXYGEN AT 2L/MIN VIA NASAL CANNULA  AND 
SATURATION 97%. TELE MONITOR IN PLACED READING SB-SR (45-72 BPM). IV ACCESS AT RIGHT HAND # 
20G AND LEFT AC # 18G PATENT AND INTACT. SEEN AND EXAMINED BY MD WITH ORDERS MADE AND 
CARRIED OUT.  ALL MEDICATION WAS GIVEN. SAFETY PRECAUTION WAS IN PLACED. CHECKED PATIENT 
EVERY 2 HOURS. BED IN LOWEST POSITION AND LOCKED. SIDERAILS UP X2. CALL LIGHT WITHIN REACH. 
WILL ENDORSED TO NIGHT SHIFT FOR KAYA.

## 2020-11-18 NOTE — NUR
TELE/RN OPENING NOTES

RECEIVED PATIENT AWAKEON BED, PATIENT ALERT AND ORIENTED X 4. PATIENT IN NO APPARENT 
RESPIRATORY DISTRESS NOTED. NO COMPLAINED OF PAIN NOTED AT THIS TIME. PATIENT IN TELE 
MONITOR READING  SINUS EMIR 56 BPM. WILL CONTINUE TO MONITOR.

## 2020-11-18 NOTE — NUR
RN TELE CLOSING NOTES

PATIENT IN BED AWAKE ALERT AND ORIENTED X4 , ON 2 L VIA NC TOLERATING WELL, SP02 97%, 
RESPIRATIONS EVEN AND UNLABORED WITH EQUAL RISE AND FALL OF CHEST, DENIES ANY PAIN OR 
DISCOMFORT AT THIS TIME, ON TELE MONITORING SB 59. NO DISTRESS PRESENT, IV SITE TO LEFT HAND 
#20G INTACT AND PATENT, LEFT AC #18 G INTACT AND PATENT, NO REDNESS, NO INFILTRATION 
PRESENT. SAFETY PRECAUTIONS RENDERED, LOW BED AND LOCKED, ALL NEEDS ATTENDED , CALL LIGHT 
AND KEPT WITHIN REACH,  ROBITUSSIN PEN WAS EFFECTIVE PATIENT SLEPT WELL.WILL CONTINUE TO 
MONITOR REMAINS COMFORTABLE WILL ENDORSE TO NEXT SHIFT, ALL NEEDS MET. FLUIDS OFFERED.

## 2020-11-19 VITALS — DIASTOLIC BLOOD PRESSURE: 75 MMHG | SYSTOLIC BLOOD PRESSURE: 131 MMHG

## 2020-11-19 VITALS — DIASTOLIC BLOOD PRESSURE: 70 MMHG | SYSTOLIC BLOOD PRESSURE: 125 MMHG

## 2020-11-19 VITALS — SYSTOLIC BLOOD PRESSURE: 122 MMHG | DIASTOLIC BLOOD PRESSURE: 75 MMHG

## 2020-11-19 VITALS — DIASTOLIC BLOOD PRESSURE: 71 MMHG | SYSTOLIC BLOOD PRESSURE: 145 MMHG

## 2020-11-19 VITALS — SYSTOLIC BLOOD PRESSURE: 132 MMHG | DIASTOLIC BLOOD PRESSURE: 91 MMHG

## 2020-11-19 LAB
ALBUMIN SERPL BCP-MCNC: 2.8 G/DL (ref 3.4–5)
ALP SERPL-CCNC: 74 U/L (ref 46–116)
ALT SERPL W P-5'-P-CCNC: 35 U/L (ref 12–78)
AST SERPL W P-5'-P-CCNC: 13 U/L (ref 15–37)
BASOPHILS # BLD AUTO: 0 /CMM (ref 0–0.2)
BASOPHILS NFR BLD AUTO: 0.2 % (ref 0–2)
BILIRUB DIRECT SERPL-MCNC: 0.1 MG/DL (ref 0–0.2)
BILIRUB SERPL-MCNC: 0.5 MG/DL (ref 0.2–1)
BUN SERPL-MCNC: 21 MG/DL (ref 7–18)
CALCIUM SERPL-MCNC: 9.5 MG/DL (ref 8.5–10.1)
CHLORIDE SERPL-SCNC: 105 MMOL/L (ref 98–107)
CK SERPL-CCNC: 17 U/L (ref 26–192)
CO2 SERPL-SCNC: 27 MMOL/L (ref 21–32)
CREAT SERPL-MCNC: 0.9 MG/DL (ref 0.6–1.3)
EOSINOPHIL NFR BLD AUTO: 0.5 % (ref 0–6)
FERRITIN SERPL-MCNC: 431 NG/ML (ref 8–388)
GLUCOSE SERPL-MCNC: 90 MG/DL (ref 74–106)
HCT VFR BLD AUTO: 36 % (ref 33–45)
HGB BLD-MCNC: 11.9 G/DL (ref 11.5–14.8)
LYMPHOCYTES NFR BLD AUTO: 1.7 /CMM (ref 0.8–4.8)
LYMPHOCYTES NFR BLD AUTO: 27 % (ref 20–44)
MAGNESIUM SERPL-MCNC: 2.4 MG/DL (ref 1.8–2.4)
MCHC RBC AUTO-ENTMCNC: 33 G/DL (ref 31–36)
MCV RBC AUTO: 88 FL (ref 82–100)
MONOCYTES NFR BLD AUTO: 0.5 /CMM (ref 0.1–1.3)
MONOCYTES NFR BLD AUTO: 8.3 % (ref 2–12)
NEUTROPHILS # BLD AUTO: 4.1 /CMM (ref 1.8–8.9)
NEUTROPHILS NFR BLD AUTO: 64 % (ref 43–81)
PHOSPHATE SERPL-MCNC: 3 MG/DL (ref 2.5–4.9)
PLATELET # BLD AUTO: 370 /CMM (ref 150–450)
POTASSIUM SERPL-SCNC: 4.2 MMOL/L (ref 3.5–5.1)
PROT SERPL-MCNC: 7.4 G/DL (ref 6.4–8.2)
RBC # BLD AUTO: 4.05 MIL/UL (ref 4–5.2)
SODIUM SERPL-SCNC: 140 MMOL/L (ref 136–145)
WBC NRBC COR # BLD AUTO: 6.5 K/UL (ref 4.3–11)

## 2020-11-19 RX ADMIN — DEXTROSE MONOHYDRATE SCH MLS/HR: 50 INJECTION, SOLUTION INTRAVENOUS at 08:52

## 2020-11-19 RX ADMIN — DEXAMETHASONE SODIUM PHOSPHATE SCH MG: 10 INJECTION INTRAMUSCULAR; INTRAVENOUS at 08:51

## 2020-11-19 RX ADMIN — PANTOPRAZOLE SODIUM SCH MG: 40 TABLET, DELAYED RELEASE ORAL at 08:52

## 2020-11-19 RX ADMIN — ENOXAPARIN SODIUM SCH MG: 40 INJECTION SUBCUTANEOUS at 09:03

## 2020-11-19 RX ADMIN — AZITHROMYCIN DIHYDRATE SCH MG: 250 TABLET, FILM COATED ORAL at 08:52

## 2020-11-19 NOTE — NUR
RN  NOTE



THE PATIENT IS RECEIVED IN BED. PATIENT IS ALERT AND ORIENTED X4. DENIES PAIN. IN ROOM AIR 
AND DENIES SOB. RESPIRATION REGULAR AND UNLABORED. TELE BOX READING IS SINUS BRADYCARDIA 50. 
RIGHT HAND G 20 PATENT AND SALINE LOCKED. BED LOW AND LOCKED. SIDE RAILS UP X2. CALL LIGHT 
WITHIN REACH. WILL CONTINUE TO MONITOR.

## 2020-11-19 NOTE — NUR
RN  CLOSING NOTE



THE PATIENT IS ALERT AND ORIENTED X4. DENIES PAIN. IN ROOM AIR AND SATURATION IS AT 97%. 
DENIES SOB. RESPIRATION REGULAR AND UNLABORED. THE PATIENT IS IN NO APPARENT DISTRESS. TELE 
BOX READING IS SINUS BRADYCARDIA 59. LEFT HAND G 20 NAD LAC G 18 BOTH PATENT AND SALINE 
LOCKED. BED LOW AND LOCKED. CALL LIGHT WITHIN REACH. WILL ENDORSE TO NIGHT SHIFT.

## 2020-11-19 NOTE — NUR
RN CLOSING NOTES



Pt on bed, afebrile the whole shift. All nursing needs attended. No new unusualities noted. 
Kept on bed clean, dry and comfortable. Endorsed.

## 2020-11-19 NOTE — NUR
TELE RN OPENING NOTES





RECEIVED PATIENT IN BED, ALERT AND ORIENTED X 4. Venezuelan SPEAKING AND ABLE TO FOLLOW 
DIRECTIONS. BREATHING REGULAR AND UNLABORED ON ROOM AIR. LEFT AC G18 AND RIGHT HAND G20 IV 
LINE INTACT AND PATENT, FLUSHING WELL WITH NO BLEEDING OR S/S OF INFILTRATION NOTED. ON 
CARDIAC MONITORING WITH SINUS BRADYCARDIA AT 58bpm. NO COMPLAINTS OF PAIN/DISCOMFORT 
REPORTED AT THIS TIME. BED LOW AND LOCKED ON SEMI FOWLERS POSITION. CALL LIGHT IN REACH. 
WILL CONTINUE TO MONITOR.

## 2020-11-20 VITALS — DIASTOLIC BLOOD PRESSURE: 75 MMHG | SYSTOLIC BLOOD PRESSURE: 123 MMHG

## 2020-11-20 NOTE — NUR
TELE RN NOTES



DISCHARGED PATIENT ON A STABLE CONDITION, ALERT AND ORIENTED X 4. AFEBRILE WITH NO S/S OF 
DISTRESS OBSERVED. IV LINES AND ID BAND REMOVED. BELONGINGS CHECKED. DC PACKET GIVEN TO 
EMT'S. REPORT GIVEN TO JAN IN [facility].